# Patient Record
Sex: FEMALE | Race: WHITE
[De-identification: names, ages, dates, MRNs, and addresses within clinical notes are randomized per-mention and may not be internally consistent; named-entity substitution may affect disease eponyms.]

---

## 2018-05-07 ENCOUNTER — HOSPITAL ENCOUNTER (OUTPATIENT)
Dept: HOSPITAL 80 - FIMAGING | Age: 58
End: 2018-05-07
Attending: NURSE PRACTITIONER
Payer: MEDICAID

## 2018-05-07 DIAGNOSIS — R92.8: Primary | ICD-10-CM

## 2018-05-10 ENCOUNTER — HOSPITAL ENCOUNTER (EMERGENCY)
Dept: HOSPITAL 80 - CED | Age: 58
Discharge: HOME | End: 2018-05-10
Payer: MEDICAID

## 2018-05-10 VITALS — DIASTOLIC BLOOD PRESSURE: 97 MMHG | SYSTOLIC BLOOD PRESSURE: 162 MMHG

## 2018-05-10 DIAGNOSIS — H81.11: Primary | ICD-10-CM

## 2018-05-10 DIAGNOSIS — Z85.820: ICD-10-CM

## 2018-05-10 DIAGNOSIS — Z87.891: ICD-10-CM

## 2018-05-10 NOTE — EDPHY
H & P


Stated Complaint: PT. states awoke this am with the "spins",dizzy. Denies CP,SOB

,


Time Seen by Provider: 05/10/18 08:29


HPI/ROS: 





CHIEF COMPLAINT:  Vertigo





HISTORY OF PRESENT ILLNESS:  Patient is a 57-year-old female who has felt an 

intense spinning sensation since she got out of bed this morning.  This has 

never happened to her before.  She does not have any focal weakness or 

numbness.  It tends to settle down and she lies still.  It is worsened by 

movement.  She states that she feels dizzy when she tries to walk.  She has not 

vomited.  No chest pain or shortness of breath.








REVIEW OF SYSTEMS:


Constitutional:  denies: chills, fever, recent illness, recent injury


EENTM: denies: blurred vision, double vision, nose congestion


Respiratory: denies: cough, shortness of breath


Cardiac: denies: chest pain, irregular heart rate, lightheadedness, palpitations


Gastrointestinal/Abdominal: denies: abdominal pain, diarrhea, nausea, vomiting, 

blood streaked stools


Genitourinary: denies: dysuria, frequency, hematuria, pain


Musculoskeletal: denies: joint pain, muscle pain


Skin: denies: lesions, rash, jaundice, bruising


Neurological:  See HPI denies: headache, numbness, paresthesia, tingling, 

weakness


Hematologic/Lymphatic: denies: blood clots, easy bleeding, easy bruising


Immunologic/allergic: denies: HIV/AIDS, transplant








EXAM:


GENERAL:  Well-appearing, well-nourished and in no acute distress.


HEAD:  Atraumatic, normocephalic.


EYES:  Mild nystagmus with fast component to the right, Pupils equal round and 

reactive to light, extraocular movements intact, sclera anicteric, conjunctiva 

are normal.


ENT:  TMs normal, nares patent, oropharynx clear without exudates.  Moist 

mucous membranes.


NECK:  Normal range of motion, supple without lymphadenopathy or JVD.


LUNGS:  Breath sounds clear to auscultation bilaterally and equal.  No wheezes 

rales or rhonchi.


HEART:  Regular rate and rhythm without murmurs, rubs or gallops.


ABDOMEN:  Soft, nontender, normoactive bowel sounds.  No guarding, no rebound.  

No masses appreciated.


BACK:  No CVA tenderness, no spinal tenderness, step-offs or deformities


EXTREMITIES:  Normal range of motion, no pitting or edema.  No clubbing or 

cyanosis.


NEUROLOGICAL:  Cranial nerves II through XII grossly intact.  Normal speech, 

normal gait.  5/5 strength, normal movement in all extremities, normal sensation

, normal cerebellar exam, NIH score 0. 


PSYCH:  Normal mood, normal affect.


SKIN:  Warm, dry, normal turgor, no visible rashes or lesions.








Source: Patient


Exam Limitations: No limitations





- Personal History


Current Tetanus Diphtheria and Acellular Pertussis (TDAP): Unsure


Tetanus Vaccine Date: UNSURE IF WITHIN 10 Y





- Medical/Surgical History


Hx Asthma: No


Hx Chronic Respiratory Disease: No


Hx Diabetes: No


Hx Cardiac Disease: No


Hx Renal Disease: No


Hx Cirrhosis: No


Hx Alcoholism: No


Hx HIV/AIDS: No


Hx Splenectomy or Spleen Trauma: No


Other PMH: right mastectomy,rt. Breast reconstruction, melanoma at 31 y/o, GERD





- Family History


Significant Family History: No pertinent family hx





- Social History


Smoking Status: Former smoker


Alcohol Use: Sober


Drug Use: None


Constitutional: 


 Initial Vital Signs











Temperature (C)  36.4 C   05/10/18 08:17


 


Heart Rate  86   05/10/18 08:17


 


Respiratory Rate  16   05/10/18 08:17


 


Blood Pressure  152/100 H  05/10/18 08:17


 


O2 Sat (%)  97   05/10/18 08:17








 











O2 Delivery Mode               Room Air














Allergies/Adverse Reactions: 


 





No Known Allergies Allergy (Unverified 11/18/15 11:41)


 








Home Medications: 














 Medication  Instructions  Recorded


 


Herbals/Supplements -Info Only 1 each PO AD 04/02/13


 


Multivitamins [Tab-A-Kaylin] 1 each PO DAILY 04/02/13


 


Omega-3 Fatty Acids/Fish Oil [Fish 2 each PO DAILY 04/02/13





Oil 1,000 mg Softgel]  


 


Arimidex 1 mg (RX)  11/18/15


 


Meclizine HCl [Meclizine HCl 25 mg 25 mg PO BID #14 tab 05/10/18





(RX,OTC)]  


 


Omeprazole  05/10/18


 


Ondansetron Odt [Zofran Odt 4 mg 4 mg PO Q4 PRN #20 tab 05/10/18





(RX)]  














Medical Decision Making


ED Course/Re-evaluation: 





10:20 p.m. we perform the Epley maneuver.  The patient is feeling somewhat 

better but still slightly dizzy.  I will discharge her with Zofran and 

meclizine and have her follow up with ENT.  She is happy with this plan.  Her 

 is here to drive.  We discussed symptoms to watch for.  She does not 

have any focal neurologic deficits or weakness.


Differential Diagnosis: 





Partial list of the Differential diagnosis considered include but were not 

limited to;  vertigo, benign positional vertigo, vestibular neuritis and 

although unlikely based on the history and physical exam, I also considered 

central vertigo, ischemia, tumor, stroke, cardiac disease.  I discussed these 

differential diagnoses and the plan with the patient as well as the usual and 

expected course.  The patient understands that the diagnosis is provisional and 

that in medicine we are not always correct and that further workup is often 

warranted.  Usual and customary warnings were given.  All of the patient's 

questions were answered.  The patient was instructed to return to the emergency 

department should the symptoms at all worsen or return, otherwise to followup 

with the physician as we discussed.





- Data Points


Medications Given: 


 








Discontinued Medications





Meclizine HCl (Meclizine Hcl)  50 mg PO EDNOW ONE


   Stop: 05/10/18 08:40


   Last Admin: 05/10/18 08:51 Dose:  50 mg


Ondansetron HCl (Zofran Odt)  4 mg PO EDNOW ONE


   Stop: 05/10/18 08:48


   Last Admin: 05/10/18 08:50 Dose:  4 mg








Departure





- Departure


Disposition: Home, Routine, Self-Care


Clinical Impression: 


Benign paroxysmal positional vertigo


Qualifiers:


 Laterality: right Qualified Code(s): H81.11 - Benign paroxysmal vertigo, right 

ear





Condition: Fair


Instructions:  Meclizine (By mouth), Ondansetron (By mouth), Benign Paroxysmal 

Positional Vertigo (ED)


Referrals: 


Jolanta Smart MD [Primary Care Provider] - As per Instructions


Prescriptions: 


Meclizine HCl [Meclizine HCl 25 mg (RX,OTC)] 25 mg PO BID #14 tab


Ondansetron Odt [Zofran Odt 4 mg (RX)] 4 mg PO Q4 PRN #20 tab


 PRN Reason: Nausea & Vomiting

## 2018-06-05 ENCOUNTER — HOSPITAL ENCOUNTER (OUTPATIENT)
Dept: HOSPITAL 80 - FIMAGING | Age: 58
Discharge: HOME | End: 2018-06-05
Attending: INTERNAL MEDICINE
Payer: MEDICAID

## 2018-06-05 DIAGNOSIS — Z85.3: ICD-10-CM

## 2018-06-05 DIAGNOSIS — Z90.11: ICD-10-CM

## 2018-06-05 DIAGNOSIS — D24.2: Primary | ICD-10-CM

## 2018-06-05 PROCEDURE — 0HBU3ZX EXCISION OF LEFT BREAST, PERCUTANEOUS APPROACH, DIAGNOSTIC: ICD-10-PCS | Performed by: RADIOLOGY

## 2018-06-05 PROCEDURE — BH01ZZZ PLAIN RADIOGRAPHY OF LEFT BREAST: ICD-10-PCS | Performed by: RADIOLOGY

## 2018-10-05 ENCOUNTER — HOSPITAL ENCOUNTER (OUTPATIENT)
Dept: HOSPITAL 80 - FIMAGING | Age: 58
End: 2018-10-05
Attending: INTERNAL MEDICINE
Payer: MEDICAID

## 2018-10-05 DIAGNOSIS — Z78.0: ICD-10-CM

## 2018-10-05 DIAGNOSIS — M85.89: ICD-10-CM

## 2018-10-05 DIAGNOSIS — Z13.820: Primary | ICD-10-CM

## 2018-10-18 ENCOUNTER — HOSPITAL ENCOUNTER (OUTPATIENT)
Dept: HOSPITAL 80 - FIMAGING | Age: 58
End: 2018-10-18
Attending: INTERNAL MEDICINE
Payer: MEDICAID

## 2018-10-18 DIAGNOSIS — N64.89: Primary | ICD-10-CM

## 2018-12-14 ENCOUNTER — HOSPITAL ENCOUNTER (OUTPATIENT)
Dept: HOSPITAL 80 - FIMAGING | Age: 58
End: 2018-12-14
Attending: INTERNAL MEDICINE
Payer: MEDICAID

## 2018-12-14 DIAGNOSIS — Z09: Primary | ICD-10-CM

## 2018-12-14 DIAGNOSIS — Z85.3: ICD-10-CM

## 2018-12-14 DIAGNOSIS — Z90.11: ICD-10-CM

## 2019-01-18 ENCOUNTER — HOSPITAL ENCOUNTER (OUTPATIENT)
Dept: HOSPITAL 80 - FIMAGING | Age: 59
End: 2019-01-18
Attending: INTERNAL MEDICINE
Payer: MEDICAID

## 2019-01-18 DIAGNOSIS — Z15.01: ICD-10-CM

## 2019-01-18 DIAGNOSIS — Z12.39: Primary | ICD-10-CM

## 2019-01-18 DIAGNOSIS — Z17.0: ICD-10-CM

## 2019-01-18 DIAGNOSIS — Z85.3: ICD-10-CM

## 2019-01-18 DIAGNOSIS — Z80.3: ICD-10-CM

## 2019-01-18 PROCEDURE — C8908 MRI W/O FOL W/CONT, BREAST,: HCPCS

## 2019-01-18 PROCEDURE — A9585 GADOBUTROL INJECTION: HCPCS
